# Patient Record
Sex: MALE | Race: WHITE
[De-identification: names, ages, dates, MRNs, and addresses within clinical notes are randomized per-mention and may not be internally consistent; named-entity substitution may affect disease eponyms.]

---
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
## 2021-08-19 NOTE — DIAGNOSTIC IMAGING REPORT
GALLBLADDER-ABD LIMITED



HISTORY:  18 years-old Male vomiting, elevated LFTs and lipase, r/o marleny,

pancreatitis acute vomiting with elevated lipase. Patient reports congenitally

absent left kidney



COMPARISON: Acute abdominal series radiographs 4/9/2018



TECHNIQUE: Multiple real-time sonographic images of the abdominal right upper

quadrant were obtained assessing grayscale appearance and color flow



FINDINGS: 

The visualized pancreas appears unremarkable. No pancreatic ductal dilation. The

liver is within normal limits without focal mass or intrahepatic biliary ductal

dilation identified.



There is a mild amount of sludge seen within the distribution of the gallbladder

neck. There is no shadowing cholelithiasis, gallbladder wall thickening or

pericholecystic fluid collections. Common bile duct is normal, 2 mm.



Right kidney demonstrates increased echogenicity measures up to 13.4 x 5.6 x 6.2

cm. No hydronephrosis identified.



IMPRESSION: 

1. Mild gallbladder sludge without sonographic evidence of acute cholecystitis.

2. No biliary ductal dilation.

3. Mildly prominent size of the right kidney is likely secondary to compensatory

hypertrophy with absent left kidney. 







The above report was generated using voice recognition software. It may contain

grammatical, syntax or spelling errors.







Electronically signed by:  Rahul Constantino M.D.

4/9/2018 6:47 AM



Dictated Date/Time:  4/9/2018 6:44 AM
PA CHEST RADIOGRAPH AND UPRIGHT AND SUPINE AP RADIOGRAPHS OF THE ABDOMEN



CLINICAL HISTORY: Vomiting.    



COMPARISON STUDY:  No previous studies for comparison. 



FINDINGS:  Lung volumes are normal. Lungs are clear. No pneumothorax or pleural

effusion is noted. Cardiac size is normal. Mediastinal contours are

unremarkable. There is no evidence for pulmonary edema. There is no free air.

The bowel gas pattern is normal. No calcifications are identified.



IMPRESSION:  



1. No free air or evidence of bowel obstruction.



2. No acute cardiopulmonary findings. 







Electronically signed by:  Nicanor Hare M.D.

4/9/2018 7:01 AM



Dictated Date/Time:  4/9/2018 7:00 AM
today